# Patient Record
Sex: FEMALE | Race: WHITE | NOT HISPANIC OR LATINO | Employment: FULL TIME | URBAN - METROPOLITAN AREA
[De-identification: names, ages, dates, MRNs, and addresses within clinical notes are randomized per-mention and may not be internally consistent; named-entity substitution may affect disease eponyms.]

---

## 2021-11-17 ENCOUNTER — APPOINTMENT (OUTPATIENT)
Dept: RADIOLOGY | Facility: MEDICAL CENTER | Age: 31
End: 2021-11-17
Attending: EMERGENCY MEDICINE
Payer: OTHER GOVERNMENT

## 2021-11-17 ENCOUNTER — HOSPITAL ENCOUNTER (EMERGENCY)
Facility: MEDICAL CENTER | Age: 31
End: 2021-11-17
Attending: EMERGENCY MEDICINE
Payer: OTHER GOVERNMENT

## 2021-11-17 VITALS
RESPIRATION RATE: 20 BRPM | OXYGEN SATURATION: 97 % | HEART RATE: 98 BPM | SYSTOLIC BLOOD PRESSURE: 169 MMHG | TEMPERATURE: 97.7 F | WEIGHT: 218.7 LBS | HEIGHT: 63 IN | BODY MASS INDEX: 38.75 KG/M2 | DIASTOLIC BLOOD PRESSURE: 86 MMHG

## 2021-11-17 DIAGNOSIS — R05.9 COUGH: ICD-10-CM

## 2021-11-17 DIAGNOSIS — Z20.822 ENCOUNTER FOR LABORATORY TESTING FOR COVID-19 VIRUS: ICD-10-CM

## 2021-11-17 LAB
FLUAV AG SPEC QL IA: NEGATIVE
FLUBV AG SPEC QL IA: NEGATIVE
SARS-COV+SARS-COV-2 AG RESP QL IA.RAPID: DETECTED
SPECIMEN SOURCE: ABNORMAL

## 2021-11-17 PROCEDURE — 99283 EMERGENCY DEPT VISIT LOW MDM: CPT | Mod: 25

## 2021-11-17 PROCEDURE — 87426 SARSCOV CORONAVIRUS AG IA: CPT

## 2021-11-17 PROCEDURE — 71045 X-RAY EXAM CHEST 1 VIEW: CPT

## 2021-11-17 PROCEDURE — C9803 HOPD COVID-19 SPEC COLLECT: HCPCS | Performed by: EMERGENCY MEDICINE

## 2021-11-17 PROCEDURE — 87400 INFLUENZA A/B EACH AG IA: CPT

## 2021-11-18 NOTE — ED PROVIDER NOTES
"ED Provider Note    Chief Complaint:   Cough, requesting COVID-19 testing    HPI:  Tracie Sosa is a very pleasant 30-year-old woman who presents to the emergency department for COVID-19 testing.  3 days ago, she developed fatigue, followed by cough and fevers.  She has continued to feel unwell, and took a home COVID-19 test which was positive.  One of her household contacts tested positive as well.  She states that her work requires a PCR test, so she presented to the emergency department for that testing.  She did not receive a COVID-19 vaccination.  She has a past medical history significant for polycystic kidney disease.  She is not currently immune suppressed.  She does have some associated shortness of breath and generalized body aches.    Review of Systems:  See HPI for pertinent positives and negatives.     Past Medical History:       Social History:  Social History     Tobacco Use   • Smoking status: Current Every Day Smoker   • Smokeless tobacco: Not on file   Vaping Use   • Vaping Use: Never used   Substance and Sexual Activity   • Alcohol use: Not Currently   • Drug use: Yes     Comment: marijuana   • Sexual activity: Not on file       Surgical History:   has a past surgical history that includes other abdominal surgery.    Current Medications:  Home Medications    **Home medications have not yet been reviewed for this encounter**         Allergies:  No Known Allergies    Physical Exam:  Vital Signs: BP (!) 169/86   Pulse 98   Temp 36.5 °C (97.7 °F) (Temporal)   Resp 20   Ht 1.6 m (5' 3\")   Wt 99.2 kg (218 lb 11.1 oz)   LMP 06/17/2019 Comment: pocs  SpO2 97%   BMI 38.74 kg/m²   Constitutional: Alert, no acute distress  HENT: Moist mucus membranes, normal posterior pharynx, no intraoral lesions  Eyes: Normal conjunctiva  Neck: Supple, normal range of motion, no lymphadenopathy  Cardiovascular: Extremities are warm and well perfused, no murmur appreciated, normal cardiac " auscultation  Pulmonary: No respiratory distress, normal work of breathing, no accessory muscule usage, breath sounds clear and equal bilaterally  Psychiatric: Normal and appropriate mood and affect    Medical records reviewed for continuity of care.  No prior medical records available for review.    Labs:  Labs Reviewed   COV, FLU A/B, ANTIGENS, BRENT    Narrative:     Have you been in close contact with a person who is suspected  or known to be positive for COVID-19 within the last 30 days  (e.g. last seen that person < 30 days ago)->Unknown       Radiology:  DX-CHEST-PORTABLE (1 VIEW)   Final Result      No acute cardiac or pulmonary abnormalities are identified.           ED Medications Administered:  Medications - No data to display    MDM:  Ms. Sosa presents to the emergency department for COVID-19 testing.  Her symptoms began 3 to 4 days ago, BMI qualifies her for Regeneron.  I did explain the monoclonal antibody treatment option, as well as administration route, and emergency use authorization.  Given that this is experimental, Ms. Sosa declines Regeneron at this time.  COVID-19 testing was sent, she is counseled to use the [facility] [employer] cj to receive her results.  She will follow-up with her primary care physician. Return precautions were discussed with the patient, and provided in written form with the patient's discharge instructions.     Personal protective equipment including N95 surgical respirator, goggles, and gloves were used during this encounter.       Disposition:  Discharge home in stable condition    Final Impression:  1. Cough    2. Encounter for laboratory testing for COVID-19 virus        Electronically signed by: Yesy Fofana MD, 11/17/2021 7:53 PM

## 2021-11-18 NOTE — DISCHARGE INSTRUCTIONS
Please follow-up with your primary care physician, call tomorrow morning to discuss your emergency department visit.  Use the [employer] cj to receive your COVID-19 result.  Return to the emergency department if you develop any new or worsening symptoms including shortness of breath, if your oxygen level drops below 92%, if you develop severe shortness of breath, or if you have any further concerns.

## 2021-11-18 NOTE — ED NOTES
Spoke with jeremy Shields to discharge Sepsis protocol and will reassess the patient for any orders.